# Patient Record
Sex: FEMALE | Race: WHITE | NOT HISPANIC OR LATINO | Employment: FULL TIME | URBAN - METROPOLITAN AREA
[De-identification: names, ages, dates, MRNs, and addresses within clinical notes are randomized per-mention and may not be internally consistent; named-entity substitution may affect disease eponyms.]

---

## 2022-02-25 ENCOUNTER — MEDICAL CORRESPONDENCE (OUTPATIENT)
Dept: HEALTH INFORMATION MANAGEMENT | Facility: CLINIC | Age: 27
End: 2022-02-25
Payer: COMMERCIAL

## 2022-02-25 ENCOUNTER — TRANSFERRED RECORDS (OUTPATIENT)
Dept: HEALTH INFORMATION MANAGEMENT | Facility: CLINIC | Age: 27
End: 2022-02-25
Payer: COMMERCIAL

## 2022-02-25 ENCOUNTER — TRANSCRIBE ORDERS (OUTPATIENT)
Dept: MATERNAL FETAL MEDICINE | Facility: CLINIC | Age: 27
End: 2022-02-25

## 2022-02-25 DIAGNOSIS — O26.90 PREGNANCY RELATED CONDITION, ANTEPARTUM: Primary | ICD-10-CM

## 2022-02-28 ENCOUNTER — TELEPHONE (OUTPATIENT)
Dept: MATERNAL FETAL MEDICINE | Facility: CLINIC | Age: 27
End: 2022-02-28

## 2022-02-28 ENCOUNTER — TELEPHONE (OUTPATIENT)
Dept: MATERNAL FETAL MEDICINE | Facility: CLINIC | Age: 27
End: 2022-02-28
Payer: COMMERCIAL

## 2022-02-28 ENCOUNTER — PRE VISIT (OUTPATIENT)
Dept: MATERNAL FETAL MEDICINE | Facility: CLINIC | Age: 27
End: 2022-02-28
Payer: COMMERCIAL

## 2022-02-28 DIAGNOSIS — O26.90 PREGNANCY RELATED CONDITION, ANTEPARTUM: Primary | ICD-10-CM

## 2022-02-28 NOTE — TELEPHONE ENCOUNTER
RANDI Felder [email or web address] [place] to schedule at appointment. [phone].       Heike Polanco RN

## 2022-02-28 NOTE — TELEPHONE ENCOUNTER
Emerita calling back to [facility] to change appt date and time. Pt unable to make 3/2 work. Rescheduled to 3/10.    Heike Polanco RN

## 2022-02-28 NOTE — TELEPHONE ENCOUNTER
Emerita returning call to [facility] to schedule GC/L2. Pt agrees to come in on 3/2 at 130/215 at [place]. Information emailed to patient.     Heike Polanco RN

## 2022-03-01 ENCOUNTER — HOSPITAL ENCOUNTER (OUTPATIENT)
Facility: CLINIC | Age: 27
Discharge: HOME | End: 2022-03-01
Attending: OBSTETRICS & GYNECOLOGY | Admitting: OBSTETRICS & GYNECOLOGY
Payer: COMMERCIAL

## 2022-03-01 DIAGNOSIS — Z31.430 ENCOUNTER OF FEMALE FOR TESTING FOR GENETIC DISEASE CARRIER STATUS FOR PROCREATIVE MANAGEMENT: ICD-10-CM

## 2022-03-01 DIAGNOSIS — R79.89 ABNORMAL CBC: ICD-10-CM

## 2022-03-01 DIAGNOSIS — Z84.81 FAMILY HISTORY OF GENETIC DISEASE CARRIER: ICD-10-CM

## 2022-03-10 ENCOUNTER — OFFICE VISIT (OUTPATIENT)
Dept: MATERNAL FETAL MEDICINE | Facility: CLINIC | Age: 27
End: 2022-03-10
Attending: OBSTETRICS & GYNECOLOGY
Payer: COMMERCIAL

## 2022-03-10 ENCOUNTER — HOSPITAL ENCOUNTER (OUTPATIENT)
Dept: ULTRASOUND IMAGING | Facility: CLINIC | Age: 27
Discharge: HOME | End: 2022-03-10
Attending: OBSTETRICS & GYNECOLOGY
Payer: COMMERCIAL

## 2022-03-10 DIAGNOSIS — O26.90 PREGNANCY RELATED CONDITION, ANTEPARTUM: ICD-10-CM

## 2022-03-10 DIAGNOSIS — Z36.89 ENCOUNTER FOR FETAL ANATOMIC SURVEY: Primary | ICD-10-CM

## 2022-03-10 DIAGNOSIS — Z84.81 FAMILY HISTORY OF GENETIC DISEASE CARRIER: Primary | ICD-10-CM

## 2022-03-10 DIAGNOSIS — Z31.430 ENCOUNTER OF FEMALE FOR TESTING FOR GENETIC DISEASE CARRIER STATUS FOR PROCREATIVE MANAGEMENT: ICD-10-CM

## 2022-03-10 DIAGNOSIS — R79.89 ABNORMAL CBC: ICD-10-CM

## 2022-03-10 PROCEDURE — 36415 COLL VENOUS BLD VENIPUNCTURE: CPT | Performed by: OBSTETRICS & GYNECOLOGY

## 2022-03-10 PROCEDURE — 76811 OB US DETAILED SNGL FETUS: CPT | Mod: 26 | Performed by: OBSTETRICS & GYNECOLOGY

## 2022-03-10 PROCEDURE — 76811 OB US DETAILED SNGL FETUS: CPT

## 2022-03-10 PROCEDURE — 96040 HC GENETIC COUNSELING, EACH 30 MINUTES: CPT | Performed by: GENETIC COUNSELOR, MS

## 2022-03-10 NOTE — PROGRESS NOTES
[facility]  Genetic Counseling Consult    Patient:  Emerita Beyer YOB: 1995   Date of Service:  3/10/22      Emerita Beyer was seen at the [facility] for genetic consultation as part of her appointment for comprehensive ultrasound. The indication for genetic counseling is advanced maternal age. She was accompanied by her partner, Dina.        Impression/Plan:   1. Emerita has not had serum screening in this pregnancy.     2. Emerita had a comprehensive (level II) ultrasound today.  Please see the ultrasound report for further details.    3. The patient had HBB, HBA1, and HBA2 carrier screening through [facility] laboratory.  Results are expected within 14-21 days, and will be available in [place].  We will contact her to discuss the results, and a copy will be forwarded to the office of the referring OB provider. Emerita Beyer provided verbal permission for results to be left on her voicemail.    Pregnancy History:   /Parity:      Age at Delivery: 26 year old  TERRI: 2022, by Last Menstrual Period  Gestational Age: 35w6d  Emerita had routine blood work earlier in her pregnancy that had a low MCV and MCH. She recalls being told several years prior about some abnormalities in her blood counts or hemoglobin but it was never explored.  She has never needed a blood transfusion or had any related complications. Due to this abnormal MCV, she then had hemoglobin electrophoresis which detected an unknown or unidentified peak variant and did not provide further information. Emerita then had alpha globin and beta globin sequencing but unfortunately did not have deletion or duplication analysis. Efforts were made to add the deletion and duplication analysis but the sample was past stability. Emerita then had difficulties getting into genetic counseling in [place]. She shares the largest concern is if any immediate   care would be needed such as a blood transfusion and if the  would need to be transferred.     Hemoglobinopathies   Hemoglobin is a major component of red blood cells. Hemoglobinopathies are conditions that affect the level or structure of the hemoglobin and cause conditions such as thalassemia or conditions like sickle cell disease. An individual with a hemoglobinopathy often inherited the condition from their carrier parents. Some carriers can also have symptoms. Lab values like mean corpuscular volume (MCV) can suggest a hemoglobinopathy is present when the value is low. To screen for hemoglobinopathies a hemoglobin electrophoresis can be used to screen for beta thalassemia, or other hemoglobin variants like sickle cell disease. However, alpha thalassemia is often not detected on hemoglobin electrophoresis. Carrier screening by molecular sequencing methods is also available and can be helpful, especially in the case of alpha thalassemia.    The following is true for a typical individual:    4 copies of alpha genes that encode for the alpha subunit of hemoglobin (2 copies of HBA1 gene + 2 copies of HBA2 gene)     2 copies of HBB gene that encodes for the beta subunit     2 copies of HBD gene that encodes for the delta subunit     4 copies of gamma genes that encode for the gamma subunit of hemoglobin. This is typically only expressed during fetal development and decreases after birth when the beta subunit is more represented    The typical individual has the following hemoglobin composition:     >87% Hemoglobin A (HbA) which is made up of two alpha subunits and two beta subunits    <3% Hemoglobin A2 (HbA2) which is made up of two alpha subunits and two delta subunits     <2% Hemoglobin F which is made up of two alpha subunits and two gamma subunits (higher hemoglobin F can be protective if the individual has a hemoglobinopathy)    Thalassemia conditions are caused by reductions in the alpha or beta subunit.  Variant conditions, like sickle cell disease, are cause by mutations in the HBB gene that modify the structure or function of the beta subunit. Hemoglobinopathies have a wide spectrum of symptoms because an individual can have a condition due to the combination of different mutations or even mutations on an alpha and beta gene. Many hemoglobinopathies are screened for on the [place] Screening program.    Alpha thalassemia is caused by a reduction in the alpha subunit. We discussed common genotypes for carriers and affected individuals of alpha thalassemia:    Typical: (??/??) Four copies of alpha    Silent Carrier: (??/?-) Three copies of alpha    Typically asymptomatic    Alpha Thalassemia trait: (??/--) or (?-/?-) Two copies of alpha    Typically range from asymptomatic to mild anemia and abnormal red blood cell labs.     The arrangement of alpha copies is important to determine because in the situation in which the two copies are on one chromosome, that parent could pass on zero copies of alpha genes (--) or (??).      Hemoglobin H Disease: (?-/--) One copy of alpha    Symptoms can range from asymptomatic to anemia with jaundice, fatigue, bone deformities, fatigue, and other minor complications.symptoms can range from asymptomatic to anemia with jaundice, fatigue, bone deformities, fatigue, and other minor complications    Detected on  screen due to detection of hemoglobin barts which is four copies of the gamma (??/??) subunit stuck together due to a shortage of alpha subunits to join with. Typically fetal hemoglobin should be (??/??)    Alpha thalassemia major/ Hb Barts hydrops fetalis:  (--/--) Zero copies of alpha    This condition is associated with death in utero due to hydrops fetalis. If born an affected baby will be likely stillborn or die soon after birth. Experimental prenatal bone marrow transplants may be curative.     Detected on  screen due to detection of hemoglobin barts  "which is four copies of the gamma (??/??) subunit stuck together due to a shortage of alpha subunits to join with. Typically fetal hemoglobin should be (??/??)    Beta thalassemia is an inherited blood disorder that affects the beta subunit of hemoglobin which is a major component of red blood cells. Mutations in the HBB genes can result in reduced levels of the beta subunit causing a condition called thalassemia. Individuals affected with Hb beta chain-related thalassemia do not produce enough, or any, beta subunit resulting in anemia. There are two types of HBB mutations that can cause thalassemia:     â+ are mutations that reduce the amount of beta subunit produced    â0 are mutations that lead to the absence of beta subunit produced (from that HBB copy)     There are three types of beta thalassemia:    Beta thalassemia minor: Also called \"trait\" or carrier\". Individuals with minor may have anemia but are typically asymptomatic. Carriers may have HBB mutations in their two copies of â+/â or â0/â    Beta thalassemia intermedia: Individuals with intermedia may have mild anemia at a later age as well as hepatosplenomegaly due to iron overload. Individuals with intermedia may have HBB mutations in their two copies of â+/â+ or â+/â0.     Beta thalassemia major: Individuals with major have the most severe type also called Daley's anemia. A child with major will show symptoms in the first year of life and often be pale, tired, and irritable. A child's spleen, liver, and heart may also be enlarged and their overall growth can slow due to brittle or thin bones. Individuals with major typically need routine blood transfusions as treatment and as a result often need chelation therapy to eliminate excess iron in the body. Individuals with major typically have HBB mutations in their copies of â0/â0.    Individuals with any type of beta thalassemia typically have abnormal hemoglobin electrophoresis results. This abnormal " result will be reduced HbA (which is alpha and beta subunits) and increased HbA2, the degree of which will depend on the type of thalassemia. Individuals with beta thalassemia still make HbA2 because it is a combination of alpha subunit and the delta subunit.     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 While most alpha thalassemia conditions are called by dosage or less copies, there can also be problems with a present copy with a mutation. The alpha globin protein that is expressed with a Arvilla mutation is an unstable alpha chain. Furthermore, in most cases Arvilla is inherited along with an alpha deletion. Since Emerita's testing did not include deletion/duplication testing, it is unknown if she also has a deletion. If Emerita does have (?Arvilla - / ??) this likely does not have significant health concerns for Emerita other than an explanation of her abnormal CBC. Reproductively, there would be concern if Emerita's partner, Dina was (--/??). This in combination could possible cause alpha thalassemia major or Hemoglobin H disease like disease. Since the alpha thalassemia major presentation is very severe and typically lethal in utero due to hydrops, there is little concern for this pregnancy since the ultrasound today was normal. Hemoglobin H disease could still be possible and in most cases does require life long care but it typically is not severe immediately in the  stage.    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 Similar variants in the 5' UTR have been shown to reduce transcript levels but possible not stability. This could in theory lead to a reduce production of beta subunit. This variant likely does not have significance for Emerita alone and would likely only be a concern reproductively if  Emerita's partner, Dina, is a carrier of any beta globin variant.     Since Emerita's partner, Dina, is of  ancestry there is a reduced chance that he is a carrier of an alpha or beta globin chain variant. Most individuals that have variants in these genes are of  or Mediterranean ancestry. However, the chance is not zero and if Dina has any variants, it will be important to know in combination with Florinda.     Medical History:   Emerita s reported medical history is not expected to impact pregnancy management or risks to fetal development.       Family History:   A three-generation pedigree was obtained, and is scanned under the  Media  tab.   The following significant findings were reported by Emerita:    The father of the pregnancy, Dina, 30, is healthy.       Emerita has one paternal cousin with Down syndrome.   We discussed that about 95% of cases with Down syndrome are sporadic, meaning they only affect that pregnancy and there is not family history. This type of Down syndrome is caused by trisomy 21 due to non-disjunction. We also explained there is a less common type of Down syndrome that can be inherited and could put family members at an increased risk for Down syndrome. This type of Down syndrome is caused by a chromosomal translocation in which a part of chromosome 21 is attached to another chromosome.   This history is unlikely to increase risks for Marciano.      Emerita's mother has several healthy siblings but also a brother that  within a year of birth. Emerita is unsure of more details    Family history such as stillbirths, infant deaths, or pregnancy complications can be difficult to assess if this occurred many decades ago since details are typically scarce. Therefore, it is challenging to assess if this family history modifies risks to the current pregnancy. This is often unlikely, especially if the history is several generations away from the current pregnancy. If more information  is collected regarding this family history it should be revisited.       Both family histories were negative or unremarkable for hemoglobinopathies and blood transfusions     Otherwise, the reported family history is negative for multiple miscarriages, stillbirths, birth defects, mental retardation, known genetic conditions, and consanguinity.       Carrier Screening:   The patient reports that she and the father of the pregnancy have  ancestry:      Cystic fibrosis is an autosomal recessive genetic condition that occurs with increased frequency in individuals of  ancestry and carrier screening for this condition is available.  In addition,  screening in the [facility] includes cystic fibrosis.    The patient reports that she is of  ([place]) ancestry:      The hemoglobinopathies are a group of genetic blood diseases that occur with increased frequency in individuals of  ancestry and carrier screening for these conditions is available.  Carrier screening for the hemoglobinopathies includes a CBC with red blood cell indices, a ferritin level, and a quantitative hemoglobin electrophoresis or HPLC.  In addition,  screening in the [facility] includes many of the hemoglobinopathies.      Expanded carrier screening for mutations in a large panel of genes associated with autosomal recessive conditions including cystic fibrosis, spinal muscular atrophy, and others, is now available.    The patient elected to pursue carrier screening today.  Her blood was drawn for HBB, HBA1, and HBA2 and sent to [facility].  We will contact her when these test results become available. Emerita's partner, Dina, also had carrier screening for the same genes.     We discussed carrier screening can be done before or during a pregnancy. The purpose of carrier screening is providing an individual or couple with a personalized risk assessment for genetic conditions. This is  accomplished by screening an individual to determine if they are a carrier for a genetic condition.     The following was discussed as part of informed consent in addition to the above information:    We discussed [employer] carrier screening which is offered with several different panels. Dina choose do only do HBB, HBA1, and HBA2 and declined the larger panels.     We discussed there are limitations such as current technology and rare chance of a false positive or negative.     Results are typically available in 10-21 days.     Finanancial responsibility.        It was a pleasure to be involved with [facility]. Face-to-face time of the meeting was 45 minutes.    Irene Villa MS, [facility]  Licensed Genetic Counselor   [facility]  Maternal Fetal Medicine  [email or web address]  [email or web address]  Office: [phone]  Pager [phone]  M: [phone]   Fax: [phone]

## 2022-03-10 NOTE — PROGRESS NOTES
"Please see \"Imaging\" tab under \"Chart Review\" for details of today's US.    Acacia Lake, DO    "

## 2022-03-15 ENCOUNTER — DOCUMENTATION ONLY (OUTPATIENT)
Dept: MATERNAL FETAL MEDICINE | Facility: CLINIC | Age: 27
End: 2022-03-15
Payer: COMMERCIAL

## 2022-03-15 NOTE — PROGRESS NOTES
Received notification from [place] that Emerita and her partner, Dina, cancelled their carrier screening for HBB, HBA1, and HBA2. Further testing is still recommended for Emerita in the future to understand her alpha and beta globin variants. Fortunately, this pregnancy had no signs of fetal anemia. However, if Dina has any alpha or beta variants, and they pursue a future pregnancy there could be possibly be a different fetal presentation next time. They are encouraged to notify their daughter's pediatrician of this family history to ensure she is properly monitored for hemoglobin levels. Since Emerita's alpha and beta variants are uncommon, they will likely not be picked up on  screen. All of Emerita's children will have a chance to be carriers of alpha and beta thalassemia similar to Emerita and they should be informed before they start families so they can make the decision to have carrier screening. If Emerita develops any significant anemia or related health problems, a referral to hematology is recommended.     Irene Villa MS, [facility]  Licensed Genetic Counselor   [facility]  Maternal Fetal Medicine  [email or web address]  [email or web address]  Office: [phone]  Pager [phone]  MFM: [phone]   Fax: [phone]

## 2022-03-18 ENCOUNTER — DOCUMENTATION ONLY (OUTPATIENT)
Dept: MATERNAL FETAL MEDICINE | Facility: CLINIC | Age: 27
End: 2022-03-18
Payer: COMMERCIAL

## 2022-03-18 NOTE — TELEPHONE ENCOUNTER
Spoke with Emerita's provider, Dr. Kee, in [place]. She was concerned since Emerita and her partner canceled their carrier screening for alpha and beta genes which was drawn and sent to [facility]. They decided they did not want to use [facility]. We discussed that we use [facility] because they offer the appropriate testing to include both full sequencing and deletion and duplication testing and had self pay out of pocket maximums. They could go through [facility] laboratories if they want but that is not our typical practice and that will be more expensive for her partner's testing. While we can't guarantee outcomes, it is reassuring the fetus did not look anemic on ultrasound and we hope the delivery is uncomplicated. Emerita is planning to deliver in [place] where there is not access to a full NICU which would only be an option if she delivers in [place]. Since we do not have the full information of hemoglobinopathy, its reasonable if Emerita wants to deliver closer to a NICU but that will be her decision. Testing with a genetic counselor in [place] would be recommended before a future pregnancy.     rIene Villa MS, [facility]  Licensed Genetic Counselor   [facility]  Maternal Fetal Medicine  [email or web address]  [email or web address]  Office: [phone]  Pager [phone]  [facility]: [phone]   Fax: [phone]

## 2022-04-03 ENCOUNTER — HEALTH MAINTENANCE LETTER (OUTPATIENT)
Age: 27
End: 2022-04-03

## 2022-10-03 ENCOUNTER — HEALTH MAINTENANCE LETTER (OUTPATIENT)
Age: 27
End: 2022-10-03

## 2023-05-21 ENCOUNTER — HEALTH MAINTENANCE LETTER (OUTPATIENT)
Age: 28
End: 2023-05-21

## 2024-07-28 ENCOUNTER — HEALTH MAINTENANCE LETTER (OUTPATIENT)
Age: 29
End: 2024-07-28